# Patient Record
Sex: MALE | Race: WHITE | NOT HISPANIC OR LATINO | URBAN - METROPOLITAN AREA
[De-identification: names, ages, dates, MRNs, and addresses within clinical notes are randomized per-mention and may not be internally consistent; named-entity substitution may affect disease eponyms.]

---

## 2017-06-07 ENCOUNTER — IMPORTED ENCOUNTER (OUTPATIENT)
Dept: URBAN - METROPOLITAN AREA CLINIC 31 | Facility: CLINIC | Age: 62
End: 2017-06-07

## 2017-06-07 PROBLEM — H26.491: Noted: 2017-06-07

## 2017-06-07 PROBLEM — H35.371: Noted: 2017-06-07

## 2017-06-07 PROBLEM — H17.89: Noted: 2017-06-07

## 2017-06-07 PROBLEM — Z96.1: Noted: 2017-06-07

## 2017-06-07 PROCEDURE — 99213 OFFICE O/P EST LOW 20 MIN: CPT

## 2017-06-07 NOTE — PATIENT DISCUSSION
1.  PCO  OD (Posterior Capsule Opacification) t/c yag on return in fall. Monitor for yag capsulotomy necessity. 2. Pseudophakia OU - IOLs stable. Monitor. 3. Epiretinal Membrane OD - monitor with OCT4. S/P  Lasik:5. Return for an appointment in 6 months for comprehensive exam. OCT Macula. BAT. with Dr. Yony Jimenez.

## 2018-04-03 ENCOUNTER — IMPORTED ENCOUNTER (OUTPATIENT)
Dept: URBAN - METROPOLITAN AREA CLINIC 31 | Facility: CLINIC | Age: 63
End: 2018-04-03

## 2018-04-03 PROBLEM — H26.493: Noted: 2018-04-03

## 2018-04-03 PROBLEM — H35.373: Noted: 2018-04-03

## 2018-04-03 PROBLEM — Z96.1: Noted: 2018-04-03

## 2018-04-03 PROBLEM — H04.123: Noted: 2018-04-03

## 2018-04-03 PROCEDURE — 92014 COMPRE OPH EXAM EST PT 1/>: CPT

## 2018-04-03 PROCEDURE — 92134 CPTRZ OPH DX IMG PST SGM RTA: CPT

## 2018-04-03 NOTE — PATIENT DISCUSSION
PCO OU: (Posterior Capsule Opacification)  Not visually significant at this time. Monitor for yag capsulotomy necessity.

## 2018-04-03 NOTE — PATIENT DISCUSSION
Return for an appointment in 12 months for comprehensive exam. OCT Macula. with Dr. Barbar Castleman.

## 2018-04-03 NOTE — PATIENT DISCUSSION
1.  Dry Eye OU:  Continue current management with Artificial Tears. 2.  PCO OU: (Posterior Capsule Opacification)  Not visually significant at this time. Monitor for yag capsulotomy necessity. 3. Pseudophakia OU - IOLs stable. Monitor. 4. Epiretinal Membrane OU - mild stable OCT5. Return for an appointment in 12 months for comprehensive exam. OCT Macula. with Dr. Armando Clarke.

## 2018-04-12 NOTE — PATIENT DISCUSSION
*CATARACTS, OU -  VISUALLY SIGNIFICANT BUT NOT BOTHERSOME TO PATIENT AT THIS TIME. SPEC RX OFFERED. FOLLOW AS SCHEDULED.

## 2019-04-17 ENCOUNTER — IMPORTED ENCOUNTER (OUTPATIENT)
Dept: URBAN - METROPOLITAN AREA CLINIC 31 | Facility: CLINIC | Age: 64
End: 2019-04-17

## 2019-04-17 PROBLEM — H04.123: Noted: 2019-04-17

## 2019-04-17 PROBLEM — Z96.1: Noted: 2019-04-17

## 2019-04-17 PROBLEM — H35.373: Noted: 2019-04-17

## 2019-04-17 PROBLEM — H26.493: Noted: 2019-04-17

## 2019-04-17 PROCEDURE — 92134 CPTRZ OPH DX IMG PST SGM RTA: CPT

## 2019-04-17 PROCEDURE — 92015 DETERMINE REFRACTIVE STATE: CPT

## 2019-04-17 PROCEDURE — 92014 COMPRE OPH EXAM EST PT 1/>: CPT

## 2019-04-17 NOTE — PATIENT DISCUSSION
1.  Dry Eye OU:  Continue current management with Artificial Tears. 2.  PCO OU: (Posterior Capsule Opacification)  Not visually significant at this time. Monitor for yag capsulotomy necessity. 3. Pseudophakia OU - IOLs stable. Monitor. residual astigmatism interested in CL for golf schedule fit4. Epiretinal Membrane OU - mild stable OCT5. Return for an appointment in 12 months for comprehensive exam. OCT Macula. with Dr. Marcos Mcclure. Return for an appointment for contact lens evaluation.  with Dr. Terence Sheridan for Contact Lens Eval.

## 2019-04-18 NOTE — PATIENT DISCUSSION
*CATARACTS, OU - SLOWLY BECOMING VISUALLY SIGNIFICANT BUT NOT BOTHERSOME TO PATIENT AT THIS TIME. SPEC RX OFFERED. FOLLOW AS SCHEDULED.

## 2019-05-08 ENCOUNTER — IMPORTED ENCOUNTER (OUTPATIENT)
Dept: URBAN - METROPOLITAN AREA CLINIC 31 | Facility: CLINIC | Age: 64
End: 2019-05-08

## 2019-05-08 PROCEDURE — 92310 CONTACT LENS FITTING OU: CPT

## 2019-05-08 NOTE — PATIENT DISCUSSION
Order trial daily disposables and get in as fast as possible. To be seen on that day because leaving town. Trial Biofinity was unsuccessful. Would like for golf.

## 2019-05-15 ENCOUNTER — IMPORTED ENCOUNTER (OUTPATIENT)
Dept: URBAN - METROPOLITAN AREA CLINIC 31 | Facility: CLINIC | Age: 64
End: 2019-05-15

## 2019-05-15 NOTE — PATIENT DISCUSSION
1.  Best VA and comfort with good fit with AV 1 Day Moist. Dispense trials. Can order if satisfied. 2. Return for an appointment in 1 year for comprehensive exam. with Dr. Maryan Perry.

## 2020-05-22 ENCOUNTER — IMPORTED ENCOUNTER (OUTPATIENT)
Dept: URBAN - METROPOLITAN AREA CLINIC 31 | Facility: CLINIC | Age: 65
End: 2020-05-22

## 2020-05-22 PROBLEM — Z96.1: Noted: 2020-05-22

## 2020-05-22 PROBLEM — H35.373: Noted: 2020-05-22

## 2020-05-22 PROBLEM — H26.493: Noted: 2020-05-22

## 2020-05-22 PROCEDURE — 92134 CPTRZ OPH DX IMG PST SGM RTA: CPT

## 2020-05-22 PROCEDURE — 92014 COMPRE OPH EXAM EST PT 1/>: CPT

## 2020-05-22 NOTE — PATIENT DISCUSSION
1.  PCO OU: (Posterior Capsule Opacification)  Not visually significant at this time. Monitor for yag capsulotomy necessity. 2. Pseudophakia OU - IOLs stable. Monitor for changes in vision. 3. Epiretinal Membrane OU - stable on OCT4. Return for an appointment in 12 months for comprehensive exam. [facility] OCT Macula. with Dr. Armando Clarke.

## 2020-05-22 NOTE — PATIENT DISCUSSION
Return for an appointment in 12 months for comprehensive exam. [facility] OCT Macula. with Dr. Solitario Hidalgo.

## 2020-06-01 ENCOUNTER — IMPORTED ENCOUNTER (OUTPATIENT)
Dept: URBAN - METROPOLITAN AREA CLINIC 31 | Facility: CLINIC | Age: 65
End: 2020-06-01

## 2020-06-01 PROCEDURE — V2521 CNTCT LENS HYDROPHILIC TORIC: HCPCS

## 2020-06-01 PROCEDURE — 92310 CONTACT LENS FITTING OU: CPT

## 2020-06-01 PROCEDURE — V2520 CONTACT LENS HYDROPHILIC: HCPCS

## 2020-06-01 NOTE — PATIENT DISCUSSION
1.  Refractive error - Continue present contact lens modality with slight power update OD. Stop/wear and call if any redness pain or decrease in vision occur. 2.  Return for an appointment in 1 year for comprehensive exam. with Dr. Vanessa Crockett.

## 2021-04-28 ENCOUNTER — IMPORTED ENCOUNTER (OUTPATIENT)
Dept: URBAN - METROPOLITAN AREA CLINIC 31 | Facility: CLINIC | Age: 66
End: 2021-04-28

## 2021-04-28 PROBLEM — H26.493: Noted: 2021-04-28

## 2021-04-28 PROBLEM — H43.811: Noted: 2021-04-28

## 2021-04-28 PROBLEM — H35.373: Noted: 2021-04-28

## 2021-04-28 PROBLEM — Z96.1: Noted: 2021-04-28

## 2021-04-28 PROCEDURE — 92014 COMPRE OPH EXAM EST PT 1/>: CPT

## 2021-04-28 PROCEDURE — 92015 DETERMINE REFRACTIVE STATE: CPT

## 2021-04-28 PROCEDURE — 92134 CPTRZ OPH DX IMG PST SGM RTA: CPT

## 2021-04-28 NOTE — PATIENT DISCUSSION
1.  PCO  OU (Posterior Capsule Opacification)   PCO is visually significant and impairment of vision does not meet the patient’s functional needs or interferes with activities of daily living. Risks benefits and alternatives to the Nd:YAG Laser reviewed including elevated IOP immediately postop and retinal tear/detachment. Patient to notify their ophthalmologist promptly if they have a significant change in symptoms such as flashes of light (photopsia) an increase in floaters loss of visual field or decrease in visual acuity after the procedure. Patient will be scheduled in [place] [id number] for Nd:YAG Laser. Schedule OS/OD2. Pseudophakia OU - IOLs stable. Monitor for changes in vision. 3. PVD OD: Patient was cautioned to call our office immediately if they experience a substantial change in their symptoms such as an increase in floaters persistent flashes loss of visual field (may appear as a shadow or a curtain) or decrease in visual acuity as these may indicate a retinal tear or detachment. If this is a new problem patient will need to return for re-examination  as determined by the [address] [place] [address] 10.   Epiretinal Membrane OU - stable OCT monitor

## 2021-06-07 NOTE — PATIENT DISCUSSION
DERMATOCHALASIS / PTOSIS RUL AND BETSY :  VISUALLY SIGNIFICANT TO PATIENT. SCHEDULE WITH OCULOPLASTIC SPECIALIST IF PATIENT DESIRES.

## 2021-09-01 ENCOUNTER — OFFICE VISIT (OUTPATIENT)
Dept: URBAN - METROPOLITAN AREA CLINIC 121 | Facility: CLINIC | Age: 66
End: 2021-09-01

## 2021-10-06 ENCOUNTER — IMPORTED ENCOUNTER (OUTPATIENT)
Dept: URBAN - METROPOLITAN AREA CLINIC 31 | Facility: CLINIC | Age: 66
End: 2021-10-06

## 2021-10-06 PROBLEM — H04.123: Noted: 2021-10-06

## 2021-10-06 PROBLEM — H17.89: Noted: 2021-10-06

## 2021-10-06 PROBLEM — Z96.1: Noted: 2021-10-06

## 2021-10-06 PROCEDURE — 99214 OFFICE O/P EST MOD 30 MIN: CPT

## 2021-10-06 NOTE — PATIENT DISCUSSION
1.  Dry Eye OU:  Continue current management with Artificial Tears and dru qhs MMp-9 negative option oe Eyesuvis or plugs if worsens pt will let us know if he wants to try2. Pseudophakia OU - IOLs stable. Monitor for changes in vision. 3. S/P  Lasik:4. Return for an appointment in 12 months for comprehensive exam. Topography. with Dr. Yi Rosario.

## 2022-04-02 ASSESSMENT — VISUAL ACUITY
OD_SC: 20/100
OD_CC: 20/30
OD_CC: 20/30-2
OS_SC: 20/40
OS_CC: 20/20-2
OS_SC: J7
OS_CC: 20/25+2
OD_CC: 20/30-1
OD_SC: J16
OU_CC: 20/25
OS_SC: 20/20-1
OD_CC: 20/30
OD_CC: 20/30
OD_SC: 20/25+1
OS_CC: 20/25
OS_CC: 20/30-1
OD_SC: 20/200
OD_GLARE: 20/50MED
OU_CC: 20/30
OD_CC: J1
OS_SC: 20/50
OD_GLARE: 20/40MED
OS_CC: J1
OD_CC: 20/25-2
OU_SC: 20/40
OS_GLARE: 20/60MED
OU_CC: J2
OS_GLARE: 20/100MED
OS_CC: 20/30
OS_CC: 20/30-1

## 2022-04-02 ASSESSMENT — TONOMETRY
OD_IOP_MMHG: 13
OS_IOP_MMHG: 12
OS_IOP_MMHG: 11
OS_IOP_MMHG: 12
OD_IOP_MMHG: 12
OD_IOP_MMHG: 11
OD_IOP_MMHG: 14
OD_IOP_MMHG: 9
OS_IOP_MMHG: 13
OS_IOP_MMHG: 9

## 2022-05-24 ENCOUNTER — OFFICE VISIT (OUTPATIENT)
Dept: URBAN - METROPOLITAN AREA CLINIC 63 | Facility: CLINIC | Age: 67
End: 2022-05-24

## 2022-06-09 ENCOUNTER — OFFICE VISIT (OUTPATIENT)
Dept: URBAN - METROPOLITAN AREA SURGERY CENTER 4 | Facility: SURGERY CENTER | Age: 67
End: 2022-06-09

## 2022-07-09 ENCOUNTER — TELEPHONE ENCOUNTER (OUTPATIENT)
Dept: URBAN - METROPOLITAN AREA CLINIC 121 | Facility: CLINIC | Age: 67
End: 2022-07-09

## 2022-07-09 RX ORDER — SILDENAFIL CITRATE 100 MG/1
TABLET ORAL
Refills: 0 | OUTPATIENT
Start: 2022-03-29 | End: 2022-05-24

## 2022-07-09 RX ORDER — OMEPRAZOLE 20 MG/1
CAPSULE, DELAYED RELEASE ORAL ONCE A DAY
Refills: 0 | OUTPATIENT
Start: 2022-05-24 | End: 2022-05-24

## 2022-07-09 RX ORDER — BACLOFEN 10 MG/1
TABLET ORAL
Refills: 0 | OUTPATIENT
Start: 2022-02-17 | End: 2022-05-24

## 2022-07-09 RX ORDER — METHYLPREDNISOLONE 4 MG/1
TABLET ORAL
Refills: 0 | OUTPATIENT
Start: 2022-02-17 | End: 2022-05-24

## 2022-07-09 RX ORDER — OMEPRAZOLE 20 MG/1
CAPSULE, DELAYED RELEASE ORAL
Refills: 0 | OUTPATIENT
Start: 2021-09-23 | End: 2022-05-24

## 2022-07-10 ENCOUNTER — TELEPHONE ENCOUNTER (OUTPATIENT)
Dept: URBAN - METROPOLITAN AREA CLINIC 121 | Facility: CLINIC | Age: 67
End: 2022-07-10

## 2022-07-10 RX ORDER — SILDENAFIL CITRATE 100 MG/1
TABLET ORAL AS NEEDED
Refills: 0 | Status: ACTIVE | COMMUNITY
Start: 2022-05-24

## 2022-07-10 RX ORDER — OMEPRAZOLE 20 MG/1
CAPSULE, DELAYED RELEASE ORAL AS NEEDED
Refills: 0 | Status: ACTIVE | COMMUNITY
Start: 2022-05-24

## 2022-10-14 ENCOUNTER — PREPPED CHART (OUTPATIENT)
Dept: URBAN - METROPOLITAN AREA CLINIC 29 | Facility: CLINIC | Age: 67
End: 2022-10-14

## 2022-10-26 ENCOUNTER — ESTABLISHED PATIENT (OUTPATIENT)
Dept: URBAN - METROPOLITAN AREA CLINIC 29 | Facility: CLINIC | Age: 67
End: 2022-10-26

## 2022-10-26 DIAGNOSIS — Z96.1: ICD-10-CM

## 2022-10-26 DIAGNOSIS — H04.123: ICD-10-CM

## 2022-10-26 PROCEDURE — 92014 COMPRE OPH EXAM EST PT 1/>: CPT

## 2022-10-26 PROCEDURE — 92025 CPTRIZED CORNEAL TOPOGRAPHY: CPT

## 2022-10-26 ASSESSMENT — VISUAL ACUITY
OD_SC: 20/25-2
OS_SC: 20/20

## 2022-10-26 ASSESSMENT — TONOMETRY
OS_IOP_MMHG: 10
OD_IOP_MMHG: 9

## 2022-11-17 ENCOUNTER — ESTABLISHED PATIENT (OUTPATIENT)
Dept: URBAN - METROPOLITAN AREA CLINIC 29 | Facility: CLINIC | Age: 67
End: 2022-11-17

## 2022-11-17 DIAGNOSIS — H00.15: ICD-10-CM

## 2022-11-17 PROCEDURE — 99213 OFFICE O/P EST LOW 20 MIN: CPT

## 2022-11-17 ASSESSMENT — VISUAL ACUITY
OS_SC: 20/25+2
OD_SC: 20/30-2

## 2023-11-01 ENCOUNTER — COMPREHENSIVE EXAM (OUTPATIENT)
Dept: URBAN - METROPOLITAN AREA CLINIC 29 | Facility: CLINIC | Age: 68
End: 2023-11-01

## 2023-11-01 DIAGNOSIS — Z96.1: ICD-10-CM

## 2023-11-01 DIAGNOSIS — H04.123: ICD-10-CM

## 2023-11-01 PROCEDURE — 92014 COMPRE OPH EXAM EST PT 1/>: CPT

## 2023-11-01 ASSESSMENT — VISUAL ACUITY
OD_SC: 20/30+2
OS_SC: 20/20-2

## 2023-11-01 ASSESSMENT — TONOMETRY
OD_IOP_MMHG: 12
OS_IOP_MMHG: 12

## 2024-01-22 ENCOUNTER — EMERGENCY VISIT (OUTPATIENT)
Dept: URBAN - METROPOLITAN AREA CLINIC 29 | Facility: CLINIC | Age: 69
End: 2024-01-22

## 2024-01-22 DIAGNOSIS — Z96.1: ICD-10-CM

## 2024-01-22 DIAGNOSIS — H04.123: ICD-10-CM

## 2024-01-22 DIAGNOSIS — H15.102: ICD-10-CM

## 2024-01-22 PROCEDURE — 92012 INTRM OPH EXAM EST PATIENT: CPT

## 2024-01-22 RX ORDER — LOTEPREDNOL ETABONATE 5 MG/ML: 1 SUSPENSION/ DROPS OPHTHALMIC

## 2024-01-22 ASSESSMENT — VISUAL ACUITY
OS_SC: 20/20
OD_SC: 20/20

## 2024-02-28 ENCOUNTER — FOLLOW UP (OUTPATIENT)
Dept: URBAN - METROPOLITAN AREA CLINIC 29 | Facility: CLINIC | Age: 69
End: 2024-02-28

## 2024-02-28 DIAGNOSIS — H15.102: ICD-10-CM

## 2024-02-28 DIAGNOSIS — H04.123: ICD-10-CM

## 2024-02-28 DIAGNOSIS — Z96.1: ICD-10-CM

## 2024-02-28 PROCEDURE — 92012 INTRM OPH EXAM EST PATIENT: CPT

## 2024-02-28 ASSESSMENT — VISUAL ACUITY
OD_SC: 20/25+2
OS_SC: 20/20-1

## 2024-04-08 ENCOUNTER — FOLLOW UP (OUTPATIENT)
Dept: URBAN - METROPOLITAN AREA CLINIC 29 | Facility: CLINIC | Age: 69
End: 2024-04-08

## 2024-04-08 DIAGNOSIS — H15.102: ICD-10-CM

## 2024-04-08 PROCEDURE — 99213 OFFICE O/P EST LOW 20 MIN: CPT

## 2024-04-08 ASSESSMENT — VISUAL ACUITY
OS_SC: 20/20-1
OD_SC: 20/30-2

## 2025-01-15 ENCOUNTER — COMPREHENSIVE EXAM (OUTPATIENT)
Age: 70
End: 2025-01-15

## 2025-01-15 DIAGNOSIS — H52.223: ICD-10-CM

## 2025-01-15 DIAGNOSIS — H52.4: ICD-10-CM

## 2025-01-15 DIAGNOSIS — H04.123: ICD-10-CM

## 2025-01-15 DIAGNOSIS — H52.03: ICD-10-CM

## 2025-01-15 DIAGNOSIS — Z96.1: ICD-10-CM

## 2025-01-15 DIAGNOSIS — H02.403: ICD-10-CM

## 2025-01-15 PROCEDURE — 92015 DETERMINE REFRACTIVE STATE: CPT

## 2025-01-15 PROCEDURE — 92014 COMPRE OPH EXAM EST PT 1/>: CPT

## 2025-01-15 PROCEDURE — 92310-2 LEVEL 2 SOFT LENS UPDATE: Mod: 21

## 2025-02-05 ENCOUNTER — CONTACT LENSES/GLASSES VISIT (OUTPATIENT)
Age: 70
End: 2025-02-05

## 2025-02-05 DIAGNOSIS — H52.03: ICD-10-CM

## 2025-02-05 PROCEDURE — 92310F

## 2025-06-09 ENCOUNTER — FOLLOW UP (OUTPATIENT)
Age: 70
End: 2025-06-09

## 2025-06-09 DIAGNOSIS — H52.223: ICD-10-CM

## 2025-06-09 DIAGNOSIS — Z96.1: ICD-10-CM

## 2025-06-09 DIAGNOSIS — H52.4: ICD-10-CM

## 2025-06-09 DIAGNOSIS — H04.123: ICD-10-CM

## 2025-06-09 DIAGNOSIS — H52.03: ICD-10-CM

## 2025-06-09 PROCEDURE — 99213 OFFICE O/P EST LOW 20 MIN: CPT

## 2025-06-09 PROCEDURE — 92015 DETERMINE REFRACTIVE STATE: CPT
